# Patient Record
Sex: FEMALE | Race: WHITE | NOT HISPANIC OR LATINO | URBAN - METROPOLITAN AREA
[De-identification: names, ages, dates, MRNs, and addresses within clinical notes are randomized per-mention and may not be internally consistent; named-entity substitution may affect disease eponyms.]

---

## 2021-09-17 ENCOUNTER — EMERGENCY (EMERGENCY)
Facility: HOSPITAL | Age: 21
LOS: 1 days | Discharge: HOME | End: 2021-09-17
Admitting: EMERGENCY MEDICINE
Payer: COMMERCIAL

## 2021-09-17 VITALS
OXYGEN SATURATION: 96 % | WEIGHT: 119.93 LBS | SYSTOLIC BLOOD PRESSURE: 138 MMHG | DIASTOLIC BLOOD PRESSURE: 88 MMHG | RESPIRATION RATE: 18 BRPM | HEIGHT: 65 IN | HEART RATE: 90 BPM | TEMPERATURE: 98 F

## 2021-09-17 DIAGNOSIS — F41.9 ANXIETY DISORDER, UNSPECIFIED: ICD-10-CM

## 2021-09-17 DIAGNOSIS — R11.2 NAUSEA WITH VOMITING, UNSPECIFIED: ICD-10-CM

## 2021-09-17 DIAGNOSIS — R41.82 ALTERED MENTAL STATUS, UNSPECIFIED: ICD-10-CM

## 2021-09-17 DIAGNOSIS — F10.129 ALCOHOL ABUSE WITH INTOXICATION, UNSPECIFIED: ICD-10-CM
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
## 2021-09-17 PROCEDURE — 99284 EMERGENCY DEPT VISIT MOD MDM: CPT

## 2021-09-17 RX ORDER — ONDANSETRON 8 MG/1
4 TABLET, FILM COATED ORAL ONCE
Refills: 0 | Status: COMPLETED | OUTPATIENT
Start: 2021-09-17 | End: 2021-09-17

## 2021-09-17 RX ADMIN — ONDANSETRON 4 MILLIGRAM(S): 8 TABLET, FILM COATED ORAL at 03:39

## 2021-09-17 NOTE — ED ADULT NURSE NOTE - NS ED NURSE LEVEL OF CONSCIOUSNESS AFFECT
I have reviewed and confirmed nurses' notes for patient's medications, allergies, medical history, and surgical history. Expressive

## 2021-09-17 NOTE — ED PROVIDER NOTE - CHIEF COMPLAINT
No specimen collected. Estimated Blood Loss: <30 mL. The patient is a 21y Female complaining of AMS, N/V

## 2021-09-17 NOTE — ED PROVIDER NOTE - PATIENT PORTAL LINK FT
You can access the FollowMyHealth Patient Portal offered by [facility] by registering at the following website: [email or web address]. By joining [employer]’s FollowMyHealth portal, you will also be able to view your health information using other applications (apps) compatible with our system.

## 2021-09-17 NOTE — ED PROVIDER NOTE - CARE PLAN
1 Principal Discharge DX:	Altered mental state  Secondary Diagnosis:	Alcohol intoxication  Secondary Diagnosis:	Nausea and vomiting

## 2021-09-17 NOTE — ED PROVIDER NOTE - OBJECTIVE STATEMENT
20 yo F with PMHx of anxiety, BIBA with friend for AMS and N/V.  Pt admits to heavy alcohol consumption tonight, found to be unsteady and with few episodes of NBNB emesis.  Denies trauma, fall, HA, dizziness, bleeding, D/C, CP, SOB, palpitations, tremors, change in urinary/bowel function, and abdominal pain. No illicit drug use noted.

## 2021-09-17 NOTE — ED ADULT NURSE NOTE - CHIEF COMPLAINT QUOTE
Pt BIBA from a bar for AMS. Pt admits to alcohol use, denies drug use. Pt vomited 1x PTA. No visible s/s of trauma.

## 2021-09-17 NOTE — ED PROVIDER NOTE - CLINICAL SUMMARY MEDICAL DECISION MAKING FREE TEXT BOX
pt here for public intox, euglycemic, monitored in the ED with improved mental status, AFVSS, currently ambulatory with steady gait, tolerating PO without N/V, clear speech, without additional medical complaints noted.  Repeat exam and neuro/cranial nerve exams wnl.  No evidence of head/neck trauma. Pt feels much better and safe for discharge. Counseling provided. Medically stable for d/c. pt here for public intox, euglycemic, monitored in the ED with improved mental status, AFVSS, currently ambulatory with steady gait, tolerating PO without N/V, clear speech, without additional medical complaints noted.  Pt feels much better and safe for discharge. Friend at bedside who will take pt home, Counseling provided. Medically stable for d/c.

## 2021-09-17 NOTE — ED PROVIDER NOTE - PHYSICAL EXAMINATION
Gen - WDWN F, +AOB, no acute distress  Skin - warm, dry, intact  HEENT - AT/NC, PERRL, mild conjunctival injection, pupils 3mm b/l, TM intact with no hemotympanium b/l, no facial contusion or periorbital ecchymosis, o/p clear, uvula midline, airway patent, neck supple with no step off or midline tenderness, FROM   CV - S1S2, R/R/R  Resp - respiration non-labored, CTAB, symmetric bs b/l, no r/r/w  GI - NABS, soft, ND, NT, no rebound or guarding, no CVAT b/l  MS - w/w/p, no c/c/e, calves supple and NT  Neuro - Alert and awake, slightly slurred speech, ambulatory with unsteady gait, no focal deficits

## 2022-05-09 NOTE — ED ADULT NURSE NOTE - NS ED NURSE DC INFO COMPLEXITY
Patient Education        Nausea and Vomiting: Care Instructions  Overview     When you are nauseated, you may feel weak and sweaty and notice a lot of saliva in your mouth. Nausea often leads to vomiting. Most of the time you do not needto worry about nausea and vomiting, but they can be signs of other illnesses. Two common causes of nausea and vomiting are a stomach infection and food poisoning. Nausea and vomiting from a viral stomach infection will usually start to improve within 24 hours. Nausea and vomiting from food poisoning maylast from 12 to 48 hours. The doctor has checked you carefully, but problems can develop later. If you notice any problems or new symptoms, get medical treatment right away. Follow-up care is a key part of your treatment and safety. Be sure to make and go to all appointments, and call your doctor if you are having problems. It's also a good idea to know your test results and keep alist of the medicines you take. How can you care for yourself at home?  To prevent dehydration, drink plenty of fluids. Choose water and other clear liquids until you feel better. If you have kidney, heart, or liver disease and have to limit fluids, talk with your doctor before you increase the amount of fluids you drink.  Rest in bed until you feel better.  When you are able to eat, try clear soups, mild foods, and liquids until all symptoms are gone for 12 to 48 hours. Other good choices include dry toast, crackers, cooked cereal, and gelatin dessert, such as Jell-O. When should you call for help? Call 911 anytime you think you may need emergency care. For example, call if:     You passed out (lost consciousness). Call your doctor now or seek immediate medical care if:     You have symptoms of dehydration, such as:  ? Dry eyes and a dry mouth. ? Passing only a little urine. ? Feeling thirstier than usual.      You have new or worsening belly pain.      You have a new or higher fever.    You vomit blood or what looks like coffee grounds. Watch closely for changes in your health, and be sure to contact your doctor if:     You have ongoing nausea and vomiting.      Your vomiting is getting worse.      Your vomiting lasts longer than 2 days.      You are not getting better as expected. Where can you learn more? Go to https://chpepiceweb.[email or web address]. org and sign in to your [employer] account. Enter [id number] in the [employer] box to learn more about \"Nausea and Vomiting: Care Instructions. \"     If you do not have an account, please click on the \"Sign Up Now\" link. Current as of: July 1, 2021               Content Version: 13.2  © 2006-2022 [employer]. Care instructions adapted under license by [facility] [employer] [facility] ([facility]). If you have questions about a medical condition or this instruction, always ask your healthcare professional. [place] [address] any warranty or liability for your use of this information. Patient Education        Vasectomy: Care Instructions  What is a vasectomy? A vasectomy is surgery that makes a man unable to father a child. The doctor cuts and ties or seals the tubes that carry sperm from the testicles to the penis (the vas deferens). The fluid released when you ejaculate (semen) will no longer contain sperm. A woman cannot get pregnant if there are no sperm to fertilize her egg. To reach the vas deferens, the doctor will make either a small cut (incision) or a tiny puncture in both sides of the scrotum. You will be awake during the surgery, but you will get medicine to help you relax. A vasectomy is a permanent method of birth control. Before you have the surgery, you should be sure you no longer want to have children. A vasectomy will not change your ability to have sex or your sex drive. You will still be able to enjoy sex in the same way as before. Follow-up care is a key part of your treatment and safety.  Be sure to make and go to all appointments, and call your doctor if you are having problems. It's also a good idea to know your test results and keep a list of the medicines you take. How is a vasectomy done? · Your genital area will be washed with soap that kills germs. The area may be shaved. · You may be given medicine to relax you and make you sleepy. · You will get a shot to numb the area at the scrotum, where your doctor will make one or two small cuts. The scrotum is the skin sac that holds the testicles. · The doctor reaches the tube, which is just under the skin, through the small cut. The doctor seals, ties, or cuts each tube. The small cuts may be stitched closed. If you had a no-scalpel vasectomy, you may not have stitches at all. When should you call for help? Be sure to contact your doctor if:    · You need more information about vasectomy.     · You want to have a vasectomy. Where can you learn more? Go to https://[email or web address]. org and sign in to your [employer] account. Enter Z626 in the [employer] box to learn more about \"Vasectomy: Care Instructions. \"     If you do not have an account, please click on the \"Sign Up Now\" link. Current as of: February 10, 2021               Content Version: 13.1  © 5065-1237 [employer]. Care instructions adapted under license by [facility] ([facility]). If you have questions about a medical condition or this instruction, always ask your healthcare professional. [place] [address] any warranty or liability for your use of this information. Patient Education        Knee Pain or Injury: Care Instructions  Your Care Instructions     Injuries are a common cause of knee problems. Sudden (acute) injuries may be caused by a direct blow to the knee. They can also be caused by abnormal twisting, bending, or falling on the knee. Pain, bruising, or swelling may besevere, and may start within minutes of the injury.   Overuse is another cause of knee pain. Other causes are climbing stairs, kneeling, and other activities that use the knee. Everyday wear and tear,especially as you get older, also can cause knee pain. Rest, along with home treatment, often relieves pain and allows your knee toheal. If you have a serious knee injury, you may need tests and treatment. Follow-up care is a key part of your treatment and safety. Be sure to make and go to all appointments, and call your doctor if you are having problems. It's also a good idea to know your test results and keep alist of the medicines you take. How can you care for yourself at home?  Be safe with medicines. Read and follow all instructions on the label. ? If the doctor gave you a prescription medicine for pain, take it as prescribed. ? If you are not taking a prescription pain medicine, ask your doctor if you can take an over-the-counter medicine.  Rest and protect your knee. Take a break from any activity that may cause pain.  Put ice or a cold pack on your knee for 10 to 20 minutes at a time. Put a thin cloth between the ice and your skin.  Prop up a sore knee on a pillow when you ice it or anytime you sit or lie down for the next 3 days. Try to keep it above the level of your heart. This will help reduce swelling.  If your knee is not swollen, you can put moist heat, a heating pad, or a warm cloth on your knee.  If your doctor recommends an elastic bandage, sleeve, or other type of support for your knee, wear it as directed.  Follow your doctor's instructions about how much weight you can put on your leg. Use a cane, crutches, or a walker as instructed.  Follow your doctor's instructions about activity during your healing process. If you can do mild exercise, slowly increase your activity.  Reach and stay at a healthy weight. Extra weight can strain the joints, especially the knees and hips, and make the pain worse. Losing even a few pounds may help.   When should you call for help? Call 911 anytime you think you may need emergency care. For example, call if:     You have symptoms of a blood clot in your lung (called a pulmonary embolism). These may include:  ? Sudden chest pain. ? Trouble breathing. ? Coughing up blood. Call your doctor now or seek immediate medical care if:     You have severe or increasing pain.      Your leg or foot turns cold or changes color.      You cannot stand or put weight on your knee.      Your knee looks twisted or bent out of shape.      You cannot move your knee.      You have signs of infection, such as:  ? Increased pain, swelling, warmth, or redness. ? Red streaks leading from the knee. ? Pus draining from a place on your knee. ? A fever.      You have signs of a blood clot in your leg (called a deep vein thrombosis), such as:  ? Pain in your calf, back of the knee, thigh, or groin. ? Redness and swelling in your leg or groin. Watch closely for changes in your health, and be sure to contact your doctor if:     You have tingling, weakness, or numbness in your knee.      You have any new symptoms, such as swelling.      You have bruises from a knee injury that last longer than 2 weeks.      You do not get better as expected. Where can you learn more? Go to https://Ziippi.[employer]i.ru. org and sign in to your [employer] account. Enter K195 in the [facility] box to learn more about \"Knee Pain or Injury: Care Instructions. \"     If you do not have an account, please click on the \"Sign Up Now\" link. Current as of: July 1, 2021               Content Version: 13.2  © 1649-2118 [employer]. Care instructions adapted under license by [facility] ([facility]). If you have questions about a medical condition or this instruction, always ask your healthcare professional. [address] any warranty or liability for your use of this information. Simple: Patient demonstrates quick and easy understanding/Verbalized Understanding

## 2023-01-23 ENCOUNTER — OUTPATIENT (OUTPATIENT)
Dept: OUTPATIENT SERVICES | Facility: HOSPITAL | Age: 23
LOS: 1 days | End: 2023-01-23
Payer: COMMERCIAL

## 2023-01-23 ENCOUNTER — APPOINTMENT (OUTPATIENT)
Dept: RADIOLOGY | Facility: CLINIC | Age: 23
End: 2023-01-23

## 2023-01-23 PROCEDURE — 72100 X-RAY EXAM L-S SPINE 2/3 VWS: CPT | Mod: 26

## 2023-01-23 PROCEDURE — 72070 X-RAY EXAM THORAC SPINE 2VWS: CPT | Mod: 26

## 2023-01-24 PROBLEM — F41.9 ANXIETY DISORDER, UNSPECIFIED: Chronic | Status: ACTIVE | Noted: 2021-09-17
